# Patient Record
Sex: FEMALE | Race: BLACK OR AFRICAN AMERICAN | Employment: UNEMPLOYED | URBAN - METROPOLITAN AREA
[De-identification: names, ages, dates, MRNs, and addresses within clinical notes are randomized per-mention and may not be internally consistent; named-entity substitution may affect disease eponyms.]

---

## 2023-09-19 ENCOUNTER — HOSPITAL ENCOUNTER (EMERGENCY)
Facility: HOSPITAL | Age: 58
Discharge: HOME | End: 2023-09-19
Attending: EMERGENCY MEDICINE
Payer: MEDICAID

## 2023-09-19 VITALS
DIASTOLIC BLOOD PRESSURE: 83 MMHG | RESPIRATION RATE: 22 BRPM | OXYGEN SATURATION: 98 % | HEART RATE: 66 BPM | HEIGHT: 65 IN | BODY MASS INDEX: 41.65 KG/M2 | TEMPERATURE: 98 F | WEIGHT: 250 LBS | SYSTOLIC BLOOD PRESSURE: 135 MMHG

## 2023-09-19 DIAGNOSIS — R20.2 PARESTHESIAS: Primary | ICD-10-CM

## 2023-09-19 DIAGNOSIS — R20.2 NUMBNESS AND TINGLING IN LEFT ARM: ICD-10-CM

## 2023-09-19 DIAGNOSIS — R20.0 NUMBNESS AND TINGLING IN LEFT ARM: ICD-10-CM

## 2023-09-19 PROCEDURE — 99283 EMERGENCY DEPT VISIT LOW MDM: CPT

## 2023-09-19 PROCEDURE — 93010 ELECTROCARDIOGRAM REPORT: CPT

## 2023-09-19 PROCEDURE — 93005 ELECTROCARDIOGRAM TRACING: CPT

## 2023-09-20 LAB
ATRIAL RATE: 67 BPM
P AXIS: 36 DEGREES
P-R INTERVAL: 148 MS
Q-T INTERVAL: 420 MS
QRS DURATION: 86 MS
QTC CALCULATION (BEZET): 443 MS
R AXIS: 31 DEGREES
T AXIS: 43 DEGREES
VENTRICULAR RATE: 67 BPM

## 2023-09-20 NOTE — ED INITIAL ASSESSMENT (HPI)
Pt to ED with c/o atraumatic numbness and tingling to face, left arm, and breast area. Speech clear. Tongue midline. Face symmetrical. Pt ambulating by self with steady gait. Strength equal and strong to all extremities. Pt is alert and oriented x4. No respiratory distress noted. Denies chest pain or sob. Pt denies rash.